# Patient Record
Sex: MALE | Race: WHITE | Employment: FULL TIME | URBAN - METROPOLITAN AREA
[De-identification: names, ages, dates, MRNs, and addresses within clinical notes are randomized per-mention and may not be internally consistent; named-entity substitution may affect disease eponyms.]

---

## 2021-07-06 ENCOUNTER — APPOINTMENT (OUTPATIENT)
Dept: CT IMAGING | Age: 46
End: 2021-07-06
Payer: COMMERCIAL

## 2021-07-06 ENCOUNTER — APPOINTMENT (OUTPATIENT)
Dept: GENERAL RADIOLOGY | Age: 46
End: 2021-07-06
Payer: COMMERCIAL

## 2021-07-06 ENCOUNTER — APPOINTMENT (OUTPATIENT)
Dept: MRI IMAGING | Age: 46
End: 2021-07-06
Payer: COMMERCIAL

## 2021-07-06 ENCOUNTER — HOSPITAL ENCOUNTER (EMERGENCY)
Age: 46
Discharge: HOME | End: 2021-07-07
Attending: EMERGENCY MEDICINE
Payer: COMMERCIAL
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
## 2021-07-06 DIAGNOSIS — K59.00 CONSTIPATION, UNSPECIFIED CONSTIPATION TYPE: ICD-10-CM

## 2021-07-06 DIAGNOSIS — R20.2 PARESTHESIA OF BILATERAL LEGS: ICD-10-CM

## 2021-07-06 LAB
ALBUMIN SERPL-MCNC: 4.5 GM/DL (ref 3.4–5)
ALP BLD-CCNC: 60 IU/L (ref 40–129)
ALT SERPL-CCNC: 16 U/L (ref 10–40)
ANION GAP SERPL CALCULATED.3IONS-SCNC: 10 MMOL/L (ref 4–16)
AST SERPL-CCNC: 17 IU/L (ref 15–37)
BACTERIA: NEGATIVE /HPF
BASOPHILS ABSOLUTE: 0 K/CU MM
BASOPHILS RELATIVE PERCENT: 0.4 % (ref 0–1)
BILIRUB SERPL-MCNC: 0.3 MG/DL (ref 0–1)
BILIRUBIN URINE: NEGATIVE MG/DL
BLOOD, URINE: NEGATIVE
BUN BLDV-MCNC: 11 MG/DL (ref 6–23)
CALCIUM SERPL-MCNC: 9.3 MG/DL (ref 8.3–10.6)
CHLORIDE BLD-SCNC: 101 MMOL/L (ref 99–110)
CLARITY: CLEAR
CO2: 25 MMOL/L (ref 21–32)
COLOR: ABNORMAL
CREAT SERPL-MCNC: 0.9 MG/DL (ref 0.9–1.3)
DIFFERENTIAL TYPE: ABNORMAL
EOSINOPHILS ABSOLUTE: 0.1 K/CU MM
EOSINOPHILS RELATIVE PERCENT: 0.8 % (ref 0–3)
GFR AFRICAN AMERICAN: >60 ML/MIN/1.73M2
GFR NON-AFRICAN AMERICAN: >60 ML/MIN/1.73M2
GLUCOSE BLD-MCNC: 90 MG/DL (ref 70–99)
GLUCOSE, URINE: NEGATIVE MG/DL
HCT VFR BLD CALC: 40.9 % (ref 42–52)
HEMOGLOBIN: 14.5 GM/DL (ref 13.5–18)
IMMATURE NEUTROPHIL %: 0.3 % (ref 0–0.43)
KETONES, URINE: NEGATIVE MG/DL
LEUKOCYTE ESTERASE, URINE: NEGATIVE
LIPASE: 36 IU/L (ref 13–60)
LYMPHOCYTES ABSOLUTE: 2 K/CU MM
LYMPHOCYTES RELATIVE PERCENT: 20.4 % (ref 24–44)
MAGNESIUM: 1.8 MG/DL (ref 1.8–2.4)
MCH RBC QN AUTO: 30.5 PG (ref 27–31)
MCHC RBC AUTO-ENTMCNC: 35.5 % (ref 32–36)
MCV RBC AUTO: 85.9 FL (ref 78–100)
MONOCYTES ABSOLUTE: 1 K/CU MM
MONOCYTES RELATIVE PERCENT: 9.9 % (ref 0–4)
NITRITE URINE, QUANTITATIVE: NEGATIVE
NUCLEATED RBC %: 0 %
PDW BLD-RTO: 11.9 % (ref 11.7–14.9)
PH, URINE: 7 (ref 5–8)
PLATELET # BLD: 374 K/CU MM (ref 140–440)
PMV BLD AUTO: 9.2 FL (ref 7.5–11.1)
POTASSIUM SERPL-SCNC: 3.3 MMOL/L (ref 3.5–5.1)
PROTEIN UA: NEGATIVE MG/DL
RBC # BLD: 4.76 M/CU MM (ref 4.6–6.2)
RBC URINE: <1 /HPF (ref 0–3)
SEGMENTED NEUTROPHILS ABSOLUTE COUNT: 6.7 K/CU MM
SEGMENTED NEUTROPHILS RELATIVE PERCENT: 68.2 % (ref 36–66)
SODIUM BLD-SCNC: 136 MMOL/L (ref 135–145)
SPECIFIC GRAVITY UA: 1 (ref 1–1.03)
TOTAL IMMATURE NEUTOROPHIL: 0.03 K/CU MM
TOTAL NUCLEATED RBC: 0 K/CU MM
TOTAL PROTEIN: 6.4 GM/DL (ref 6.4–8.2)
TRICHOMONAS: ABNORMAL /HPF
UROBILINOGEN, URINE: NEGATIVE MG/DL (ref 0.2–1)
WBC # BLD: 9.9 K/CU MM (ref 4–10.5)
WBC UA: <1 /HPF (ref 0–2)

## 2021-07-06 PROCEDURE — 6360000004 HC RX CONTRAST MEDICATION: Performed by: PHYSICIAN ASSISTANT

## 2021-07-06 PROCEDURE — 83690 ASSAY OF LIPASE: CPT

## 2021-07-06 PROCEDURE — 83735 ASSAY OF MAGNESIUM: CPT

## 2021-07-06 PROCEDURE — 81001 URINALYSIS AUTO W/SCOPE: CPT

## 2021-07-06 PROCEDURE — 96375 TX/PRO/DX INJ NEW DRUG ADDON: CPT

## 2021-07-06 PROCEDURE — 96374 THER/PROPH/DIAG INJ IV PUSH: CPT

## 2021-07-06 PROCEDURE — 6360000002 HC RX W HCPCS: Performed by: PHYSICIAN ASSISTANT

## 2021-07-06 PROCEDURE — 85025 COMPLETE CBC W/AUTO DIFF WBC: CPT

## 2021-07-06 PROCEDURE — 80053 COMPREHEN METABOLIC PANEL: CPT

## 2021-07-06 PROCEDURE — 72158 MRI LUMBAR SPINE W/O & W/DYE: CPT

## 2021-07-06 PROCEDURE — 96376 TX/PRO/DX INJ SAME DRUG ADON: CPT

## 2021-07-06 PROCEDURE — 36415 COLL VENOUS BLD VENIPUNCTURE: CPT

## 2021-07-06 PROCEDURE — 72157 MRI CHEST SPINE W/O & W/DYE: CPT

## 2021-07-06 PROCEDURE — 99285 EMERGENCY DEPT VISIT HI MDM: CPT

## 2021-07-06 PROCEDURE — A9579 GAD-BASE MR CONTRAST NOS,1ML: HCPCS | Performed by: PHYSICIAN ASSISTANT

## 2021-07-06 PROCEDURE — 72100 X-RAY EXAM L-S SPINE 2/3 VWS: CPT

## 2021-07-06 RX ORDER — MORPHINE SULFATE 4 MG/ML
4 INJECTION, SOLUTION INTRAMUSCULAR; INTRAVENOUS EVERY 30 MIN PRN
Status: DISCONTINUED | OUTPATIENT
Start: 2021-07-06 | End: 2021-07-07 | Stop reason: HOSPADM

## 2021-07-06 RX ORDER — ONDANSETRON 2 MG/ML
4 INJECTION INTRAMUSCULAR; INTRAVENOUS ONCE
Status: COMPLETED | OUTPATIENT
Start: 2021-07-06 | End: 2021-07-06

## 2021-07-06 RX ADMIN — MORPHINE SULFATE 4 MG: 4 INJECTION, SOLUTION INTRAMUSCULAR; INTRAVENOUS at 22:45

## 2021-07-06 RX ADMIN — MORPHINE SULFATE 4 MG: 4 INJECTION, SOLUTION INTRAMUSCULAR; INTRAVENOUS at 21:13

## 2021-07-06 RX ADMIN — ONDANSETRON 4 MG: 2 INJECTION INTRAMUSCULAR; INTRAVENOUS at 21:13

## 2021-07-06 RX ADMIN — GADOTERIDOL 13 ML: 279.3 INJECTION, SOLUTION INTRAVENOUS at 23:42

## 2021-07-06 ASSESSMENT — PAIN SCALES - GENERAL
PAINLEVEL_OUTOF10: 8

## 2021-07-06 ASSESSMENT — PAIN DESCRIPTION - PAIN TYPE: TYPE: ACUTE PAIN

## 2021-07-07 ENCOUNTER — APPOINTMENT (OUTPATIENT)
Dept: CT IMAGING | Age: 46
End: 2021-07-07
Payer: COMMERCIAL

## 2021-07-07 VITALS
TEMPERATURE: 98.2 F | OXYGEN SATURATION: 96 % | HEART RATE: 78 BPM | SYSTOLIC BLOOD PRESSURE: 157 MMHG | DIASTOLIC BLOOD PRESSURE: 93 MMHG | RESPIRATION RATE: 16 BRPM

## 2021-07-07 PROCEDURE — 96376 TX/PRO/DX INJ SAME DRUG ADON: CPT

## 2021-07-07 PROCEDURE — 6360000004 HC RX CONTRAST MEDICATION: Performed by: EMERGENCY MEDICINE

## 2021-07-07 PROCEDURE — 6360000002 HC RX W HCPCS: Performed by: PHYSICIAN ASSISTANT

## 2021-07-07 PROCEDURE — 74177 CT ABD & PELVIS W/CONTRAST: CPT

## 2021-07-07 RX ORDER — DOCUSATE SODIUM 100 MG/1
100 CAPSULE, LIQUID FILLED ORAL 2 TIMES DAILY
Qty: 30 CAPSULE | Refills: 0 | Status: SHIPPED | OUTPATIENT
Start: 2021-07-07

## 2021-07-07 RX ORDER — SODIUM CHLORIDE 0.9 % (FLUSH) 0.9 %
5-40 SYRINGE (ML) INJECTION 2 TIMES DAILY
Status: DISCONTINUED | OUTPATIENT
Start: 2021-07-07 | End: 2021-07-07 | Stop reason: HOSPADM

## 2021-07-07 RX ORDER — DICYCLOMINE HYDROCHLORIDE 10 MG/1
20 CAPSULE ORAL
Qty: 30 CAPSULE | Refills: 0 | Status: SHIPPED | OUTPATIENT
Start: 2021-07-07

## 2021-07-07 RX ORDER — POLYETHYLENE GLYCOL 3350 17 G/17G
17 POWDER, FOR SOLUTION ORAL 2 TIMES DAILY
Qty: 1020 G | Refills: 0 | Status: SHIPPED | OUTPATIENT
Start: 2021-07-07 | End: 2021-08-06

## 2021-07-07 RX ADMIN — MORPHINE SULFATE 4 MG: 4 INJECTION, SOLUTION INTRAMUSCULAR; INTRAVENOUS at 01:48

## 2021-07-07 RX ADMIN — IOPAMIDOL 75 ML: 755 INJECTION, SOLUTION INTRAVENOUS at 00:29

## 2021-07-07 ASSESSMENT — PAIN SCALES - GENERAL
PAINLEVEL_OUTOF10: 5
PAINLEVEL_OUTOF10: 10

## 2021-07-07 NOTE — ED NOTES
Rounded on patient in MRI at this time. Patient calm and cooperative with MRI at this time. Patient denies worsening pain or need for more pain medication at this time.      Patrica Michel RN  07/06/21 [id number]

## 2021-07-07 NOTE — ED NOTES
Discharge instructions reviewed. All questions answered to pt satisfaction. Pt alert, oriented, and ambulatory upon discharge.      Kassi Ramirez RN  07/07/21 [id number]

## 2021-07-07 NOTE — ED NOTES
Impression   No acute abnormality in the thoracic spine.  No significant spinal canal or   neural foraminal narrowing.       No discitis, osteomyelitis or epidural abscess.         Antonio Moeller RN  07/07/21 5624

## 2021-07-07 NOTE — ED NOTES
Discharge instructions reviewed. All questions answered to pt satisfaction. Pt alert, oriented, and ambulatory upon discharge.      Rosales Mills RN  07/07/21 1603

## 2021-07-07 NOTE — ED PROVIDER NOTES
into the legs with a history of trauma decision made to pursue MRI imaging to rule out spinal cord injury. MRI imaging of patient's thoracic and lumbar spine and CT imaging of patient's abdomen/pelvis are negative for acute process. I do believe patient's paresthesias are secondary to some muscle irritation or nerve root irritation. There is no cauda equina or cord compression. There is no emergent indication for further evaluation treatment in the hospital at this time. Patient is appropriate for the outpatient follow-up. Symptomatic treatment provided for home-going. Questions sought and answered with the patient. They voice understanding and agree with plan. Instructed to return for any worsening or worrisome concerns. All diagnostic, treatment, and disposition decisions were made by myself in conjunction with the Advanced Practice Provider. For all further details of the patient's emergency department visit, please see the Advanced Practice Provider's documentation.        Maude Corrales MD  07/07/21 1987

## 2021-07-07 NOTE — ED PROVIDER NOTES
Emergency 3130  [facility] EMERGENCY DEPARTMENT    Patient: Delvin August  MRN: [id number]  : 1975  Date of Evaluation: 2021  ED Provider: Barbara Benjamin PA-C    Chief Complaint       Chief Complaint   Patient presents with    Back Pain     injured a week ago    Constipation     x2 days       Stephany Lo is a 39 y.o. male who presents to the emergency department following a fall. Patient states he fell about 5 feet and landed on his back on a metal ledge. Denies hitting his head, LOC. This occurred 5 days ago while working in [place]. Patient states he developed a \"knot\" to the left lower back and had pain to the area initially but states that has actually resolved. He now complains of numbness/tingling/weakness in the bilateral legs. He is still able to ambulate but with difficulty. Denies any saddle anesthesia. He is also having abdominal pain and states he hasn't had a bowel movement in 3 days. He denies nausea or vomiting. He reports reports he is having urinary incontinence. Denies dysuria, hematuria. Denies fever or chills. He denies IVDA. ROS     CONSTITUTIONAL:  Denies fever. EYES:  Denies visual changes. HEAD:  Denies headache. ENT:  Denies earache, nasal congestion, sore throat. NECK:  Denies neck pain. RESPIRATORY:  Denies any shortness of breath. CARDIOVASCULAR:  Denies chest pain. GI:  Denies nausea or vomiting.  + abd pain, constipation. :  Denies urinary symptoms. MUSCULOSKELETAL:  Denies extremity pain or swelling. BACK:  Denies back pain. INTEGUMENT:  Denies skin changes. LYMPHATIC:  Denies lymphadenopathy. NEUROLOGIC:  + numbness/tingling. PSYCHIATRIC:  Denies SI/HI. Past History   History reviewed. No pertinent past medical history.   Past Surgical History:   Procedure Laterality Date    APPENDECTOMY       Social History     Socioeconomic History    Marital status: or para-spinal tenderness to palpation. Sensation intact to bilateral LE, although he reports it is subjectively diminished. Strength is symmetric, 5/5. DTRs intact. DP intact. EXTREMITIES:  No acute deformities. SKIN:  Warm and dry. No bruising, abrasions, lacerations. NEUROLOGICAL:  Alert and oriented. PSYCHIATRIC:  Normal mood. Diagnostics     Labs:  Labs Reviewed   CBC WITH AUTO DIFFERENTIAL - Abnormal; Notable for the following components:       Result Value    Hematocrit 40.9 (*)     Segs Relative 68.2 (*)     Lymphocytes % 20.4 (*)     Monocytes % 9.9 (*)     All other components within normal limits   COMPREHENSIVE METABOLIC PANEL W/ REFLEX TO MG FOR LOW K - Abnormal; Notable for the following components:    Potassium 3.3 (*)     All other components within normal limits   URINE RT REFLEX TO CULTURE - Abnormal; Notable for the following components:    Color, UA STRAW (*)     All other components within normal limits   LIPASE   MAGNESIUM     Radiographs:    XR LUMBAR SPINE (2-3 VIEWS)    Result Date: 7/6/2021  EXAMINATION: THREE XRAY VIEWS OF THE LUMBAR SPINE 7/6/2021 6:11 pm COMPARISON: None. HISTORY: ORDERING SYSTEM PROVIDED HISTORY: lower back injury and pain TECHNOLOGIST PROVIDED HISTORY: Reason for exam:->lower back injury and pain Reason for Exam: lower back injury and pain Acuity: Acute Type of Exam: Initial Mechanism of Injury: lower back injury and pain Relevant Medical/Surgical History: na FINDINGS: Three views of the lumbar spine were reviewed. No acute fracture identified. Multilevel mild spondylosis. Alignment is anatomic. 1. No acute osseous abnormality of the lumbar spine identified. 2. Multilevel mild degenerative change.      MRI THORACIC SPINE W WO CONTRAST    Result Date: 7/7/2021  EXAMINATION: MRI OF THE THORACIC SPINE WITHOUT AND WITH CONTRAST  7/6/2021 10:03 pm TECHNIQUE: Multiplanar multisequence MRI of the thoracic spine was performed without and with the administration of intravenous contrast. COMPARISON: None HISTORY: ORDERING SYSTEM PROVIDED HISTORY: fall, pain, numbness/tingling/weakness in LEs, incontinence TECHNOLOGIST PROVIDED HISTORY: Reason for exam:->fall, pain, numbness/tingling/weakness in LEs, incontinence Decision Support Exception - unselect if not a suspected or confirmed emergency medical condition->Emergency Medical Condition (MA) Reason for Exam: fall, pain, numbness/tingling/weakness in LEs, incontinence Acuity: Acute Type of Exam: Initial Relevant Medical/Surgical History: 13 mL Prohance FINDINGS: Noise and signal loss artifact degrades evaluation of the upper thoracic spine. BONES/ALIGNMENT: There is normal alignment of the spine. The vertebral body heights are maintained. The bone marrow signal appears unremarkable. Ovoid 1.7 cm focus of T2 and stir hyperintense signal in the posterior T9 vertebral body likely represents a benign atypical hemangioma. No foci of suspicious bone marrow edema or enhancement. No evidence of discitis, osteomyelitis or epidural abscess. SPINAL CORD: No abnormal cord signal is seen. No abnormal enhancement in the thecal sac. SOFT TISSUES:  No abnormal enhancement of the thoracic spine. No paraspinal mass identified. DEGENERATIVE CHANGES: Mild multilevel degenerative disc desiccation and height loss. Small multilevel disc protrusions. No significant spinal canal stenosis or neural foraminal narrowing of the thoracic spine. No acute abnormality in the thoracic spine. No significant spinal canal or neural foraminal narrowing. No discitis, osteomyelitis or epidural abscess. MRI LUMBAR SPINE W WO CONTRAST    Result Date: 7/7/2021  EXAMINATION: MRI OF THE LUMBAR SPINE WITHOUT AND WITH CONTRAST  7/6/2021 10:03 pm TECHNIQUE: Multiplanar multisequence MRI of the lumbar spine was performed without and with the administration of intravenous contrast. COMPARISON: Lumbar spine radiographs done 07/06/2021.  HISTORY: ORDERING SYSTEM PROVIDED HISTORY: fall, low back pain, numbness/tingling/weakness of LEs, incontinence TECHNOLOGIST PROVIDED HISTORY: Reason for exam:->fall, low back pain, numbness/tingling/weakness of LEs, incontinence Decision Support Exception - unselect if not a suspected or confirmed emergency medical condition->Emergency Medical Condition (MA) Reason for Exam: fall, low back pain, numbness/tingling/weakness of LEs, incontinence Acuity: Acute Type of Exam: Initial Relevant Medical/Surgical History: 13 mL Prohance FINDINGS: BONES/ALIGNMENT: There is normal alignment of the spine. The vertebral body heights are maintained. The bone marrow signal appears unremarkable. Mild L1-L2 and L5-S1 degenerative disc desiccation and height loss. The other intervertebral discs demonstrate normal height and signal.  No foci of suspicious bone marrow edema or enhancement. No discitis, osteomyelitis or epidural abscess. SPINAL CORD:  The conus terminates normally. No abnormal enhancement in the thecal sac. SOFT TISSUES: No abnormal enhancement is seen of the lumbar spine. No paraspinal mass identified. L1-L2: There is no significant disc protrusion, spinal canal stenosis or neural foraminal narrowing. L2-L3: There is no significant disc protrusion, spinal canal stenosis or neural foraminal narrowing. L3-L4: There is no significant disc protrusion, spinal canal stenosis or neural foraminal narrowing. L4-L5: There is no significant disc protrusion, spinal canal stenosis or neural foraminal narrowing. L5-S1: There is no significant disc protrusion, spinal canal stenosis or neural foraminal narrowing. No acute abnormality in the lumbar spine. No significant spinal canal or neural foraminal narrowing. No discitis, osteomyelitis or epidural abscess.      CT ABDOMEN PELVIS W IV CONTRAST Additional Contrast? None    Result Date: 7/7/2021  EXAMINATION: CT OF THE ABDOMEN AND PELVIS WITH CONTRAST 7/7/2021 12:27 am TECHNIQUE: CT of the abdomen and pelvis was performed with the administration of intravenous contrast. Multiplanar reformatted images are provided for review. Dose modulation, iterative reconstruction, and/or weight based adjustment of the mA/kV was utilized to reduce the radiation dose to as low as reasonably achievable. COMPARISON: None. HISTORY: ORDERING SYSTEM PROVIDED HISTORY: abd pain, constipation x 3 days TECHNOLOGIST PROVIDED HISTORY: Reason for exam:->abd pain, constipation x 3 days Additional Contrast?->None Decision Support Exception - unselect if not a suspected or confirmed emergency medical condition->Emergency Medical Condition (MA) Reason for Exam: abd pain, constipation x 3 days FINDINGS: Lower Chest:  Visualized portion of the lower chest demonstrates no acute abnormality. Organs: The liver, spleen, pancreas, kidneys and adrenal glands are without acute findings. The gallbladder is present without radiopaque cholelithiasis. GI/Bowel: No mechanical bowel obstruction. The appendix is not definitely identified, but there are no secondary signs of appendicitis in the right lower quadrant. Pelvis: The urinary bladder is partially distended without contour abnormality. The prostate and seminal vesicles are without acute findings. No pelvic hematoma. Peritoneum/Retroperitoneum: No ascites or pneumoperitoneum. No lymphadenopathy. Bones/Soft Tissues: No acute bony abnormality. No acute abdominopelvic findings. ED Course and MDM   -  Patient seen and evaluated in the emergency department. -  Triage and nursing notes reviewed and incorporated. -  Old chart records reviewed and incorporated. -  Supervising physician was Dr. Missy Luis. He saw and examined patient. -  Work-up included:  See above  -  ED medications:  Morphine, Zofran  -  Results discussed with patient. MRI thoracic and lumbar spines are negative for acute findings. CT abd pelv is non-acute, no obstruction, no significant stool burden.   Labs